# Patient Record
Sex: MALE | Race: BLACK OR AFRICAN AMERICAN | URBAN - METROPOLITAN AREA
[De-identification: names, ages, dates, MRNs, and addresses within clinical notes are randomized per-mention and may not be internally consistent; named-entity substitution may affect disease eponyms.]

---

## 2018-07-09 ENCOUNTER — OFFICE VISIT (OUTPATIENT)
Dept: INTERNAL MEDICINE CLINIC | Facility: CLINIC | Age: 38
End: 2018-07-09

## 2018-07-09 VITALS
WEIGHT: 256 LBS | BODY MASS INDEX: 31.83 KG/M2 | HEIGHT: 75 IN | HEART RATE: 79 BPM | DIASTOLIC BLOOD PRESSURE: 88 MMHG | SYSTOLIC BLOOD PRESSURE: 142 MMHG

## 2018-07-09 DIAGNOSIS — Z76.89 ENCOUNTER TO ESTABLISH CARE: Primary | ICD-10-CM

## 2018-07-09 DIAGNOSIS — R41.840 DIFFICULTY CONCENTRATING: ICD-10-CM

## 2018-07-09 DIAGNOSIS — R03.0 ELEVATED BP WITHOUT DIAGNOSIS OF HYPERTENSION: ICD-10-CM

## 2018-07-09 PROCEDURE — 99212 OFFICE O/P EST SF 10 MIN: CPT | Performed by: INTERNAL MEDICINE

## 2018-07-09 PROCEDURE — 99204 OFFICE O/P NEW MOD 45 MIN: CPT | Performed by: INTERNAL MEDICINE

## 2018-07-09 NOTE — PROGRESS NOTES
HPI:    Patient ID: Saurabh Hu is a 41year old male. HPI   Patient comes in today for first time to establish care. Is complaining of decreased concentration unable to finish tasks as per him is been going on for more than a few weeks now.   Denies a Eyes: Conjunctivae and EOM are normal. Pupils are equal, round, and reactive to light. Neck: Normal range of motion. Neck supple. No thyromegaly present. Cardiovascular: Normal rate, regular rhythm, normal heart sounds and intact distal pulses.   Exam

## 2018-07-09 NOTE — PATIENT INSTRUCTIONS
ASSESSMENT/PLAN:   Encounter to establish care  (primary encounter diagnosis) will order some labs  Difficulty concentrating?,  Denies any depression or anxiety we will check labs to make sure thyroid B12 okay might need to see psych  Elevated bp without d

## 2023-02-20 ENCOUNTER — PATIENT OUTREACH (OUTPATIENT)
Dept: CASE MANAGEMENT | Age: 43
End: 2023-02-20

## 2023-02-21 NOTE — PROCEDURES
The office order for PCP request is Approved and finalized on February 20, 2023.     Thanks,  [facility] [employer]